# Patient Record
Sex: FEMALE | Race: WHITE
[De-identification: names, ages, dates, MRNs, and addresses within clinical notes are randomized per-mention and may not be internally consistent; named-entity substitution may affect disease eponyms.]

---

## 2019-08-12 ENCOUNTER — HOSPITAL ENCOUNTER (OUTPATIENT)
Dept: HOSPITAL 74 - JASU-SURG | Age: 39
Discharge: HOME | End: 2019-08-12
Attending: UROLOGY
Payer: COMMERCIAL

## 2019-08-12 VITALS — HEART RATE: 70 BPM | SYSTOLIC BLOOD PRESSURE: 126 MMHG | DIASTOLIC BLOOD PRESSURE: 92 MMHG

## 2019-08-12 VITALS — BODY MASS INDEX: 29.9 KG/M2

## 2019-08-12 VITALS — TEMPERATURE: 98 F

## 2019-08-12 DIAGNOSIS — N20.0: Primary | ICD-10-CM

## 2019-08-12 PROCEDURE — 0TF3XZZ FRAGMENTATION IN RIGHT KIDNEY PELVIS, EXTERNAL APPROACH: ICD-10-PCS | Performed by: UROLOGY

## 2019-08-12 NOTE — OP
Operative Note





- Note:


Operative Date: 08/12/19


Pre-Operative Diagnosis: Right renal stone


Operation: Right ESW

## 2019-08-12 NOTE — OP
DATE OF OPERATION:  08/12/2019

 

PREOPERATIVE DIAGNOSIS:  Right renal stone.  

 

POSTOPERATIVE DIAGNOSIS:  Right renal stone.  

 

PROCEDURE:  Right extracorporeal shockwave lithotripsy.  

 

ATTENDING:  Jessi Jacinto M.D. 

 

ANESTHESIA:  Fractional.  

 

DESCRIPTION OF PROCEDURE:  Patient was brought in the operating room, placed in a

supine position on the operating room table.  Ultrasonography and fluoroscopy were

performed.  A 5-mm right mid pole stone was identified.  Once the stone was

identified, anesthesia and preoperative antibiotics were administered. 

Extracorporeal shockwave lithotripsy was then performed.  Excellent fragmentation of

the stone was noted under real time ultrasonography and fluoroscopy.  No

complications were noted.

DISPOSITION:  To recovery room.   

 

 

JESSI CLEMENTE M.D.

 

[id number]

DD: 08/12/2019 16:51

DT: 08/12/2019 19:42

Job #:  [id number]

## 2019-08-12 NOTE — OP
Operative Note





- Note:


Operative Date: 08/12/19


Pre-Operative Diagnosis: Right renal stone


Operation: Right ESWL


Findings: 





5 mm mid pole Right renal stone3


Post-Operative Diagnosis: Same as Pre-op


Surgeon: Yariel Jacinto


Anesthesia: Fractional


Estimated Blood Loss (mls): 0


Operative Report Dictated: Yes

## 2020-02-03 ENCOUNTER — HOSPITAL ENCOUNTER (EMERGENCY)
Dept: HOSPITAL 74 - JER | Age: 40
LOS: 1 days | Discharge: LEFT BEFORE DISCHARGE | End: 2020-02-04
Payer: COMMERCIAL

## 2020-02-03 VITALS — DIASTOLIC BLOOD PRESSURE: 76 MMHG | SYSTOLIC BLOOD PRESSURE: 116 MMHG | TEMPERATURE: 97.4 F | HEART RATE: 65 BPM

## 2020-02-03 VITALS — BODY MASS INDEX: 28.1 KG/M2

## 2020-02-03 DIAGNOSIS — R42: ICD-10-CM

## 2020-02-03 DIAGNOSIS — R51: Primary | ICD-10-CM

## 2020-02-03 DIAGNOSIS — R10.31: ICD-10-CM

## 2020-02-03 DIAGNOSIS — R10.11: ICD-10-CM

## 2020-02-03 LAB
APPEARANCE UR: CLEAR
BASOPHILS # BLD: 0.7 % (ref 0–2)
BILIRUB UR STRIP.AUTO-MCNC: NEGATIVE MG/DL
COLOR UR: YELLOW
DEPRECATED RDW RBC AUTO: 13.1 % (ref 11.6–15.6)
EOSINOPHIL # BLD: 3.8 % (ref 0–4.5)
HCT VFR BLD CALC: 36.7 % (ref 32.4–45.2)
HGB BLD-MCNC: 12.3 GM/DL (ref 10.7–15.3)
KETONES UR QL STRIP: NEGATIVE
LEUKOCYTE ESTERASE UR QL STRIP.AUTO: NEGATIVE
LYMPHOCYTES # BLD: 34.6 % (ref 8–40)
MCH RBC QN AUTO: 29.6 PG (ref 25.7–33.7)
MCHC RBC AUTO-ENTMCNC: 33.4 G/DL (ref 32–36)
MCV RBC: 88.7 FL (ref 80–96)
MONOCYTES # BLD AUTO: 4.5 % (ref 3.8–10.2)
NEUTROPHILS # BLD: 56.4 % (ref 42.8–82.8)
NITRITE UR QL STRIP: NEGATIVE
PH UR: 6.5 [PH] (ref 5–8)
PLATELET # BLD AUTO: 315 K/MM3 (ref 134–434)
PMV BLD: 8.3 FL (ref 7.5–11.1)
PROT UR QL STRIP: NEGATIVE
PROT UR QL STRIP: NEGATIVE
RBC # BLD AUTO: 4.14 M/MM3 (ref 3.6–5.2)
SP GR UR: 1.02 (ref 1.01–1.03)
UROBILINOGEN UR STRIP-MCNC: 1 MG/DL (ref 0.2–1)
WBC # BLD AUTO: 8.5 K/MM3 (ref 4–10)

## 2020-02-03 PROCEDURE — 3E033GC INTRODUCTION OF OTHER THERAPEUTIC SUBSTANCE INTO PERIPHERAL VEIN, PERCUTANEOUS APPROACH: ICD-10-PCS

## 2020-02-03 NOTE — PDOC
History of Present Illness





- General


Chief Complaint: Lightheaded


Stated Complaint: HEADACHE/RT. SHOULDER PAIN


Time Seen by Provider: 02/03/20 19:16


History Source: Patient





- History of Present Illness


Initial Comments: 





02/03/20 21:56


40 year old reports that last night she woke up pain to right flank pain 

radiating to right groin. since last night patient reports having a headache 

and dizziness. [id number] this morning








DEnies abdominal pain, 





Past History





- Past Medical History


Allergies/Adverse Reactions: 


 Allergies











Allergy/AdvReac Type Severity Reaction Status Date / Time


 


Sulfa (Sulfonamide Allergy Mild Itching Verified 02/03/20 19:16





Antibiotics)     





[Sulfa(Sulfonamide     





Antibiotics)]     











Home Medications: 


Ambulatory Orders





Mirabegron [Myrbetriq] 50 mg PO DAILY 08/12/19 








Anemia: No


Asthma: No


Cancer: No


Cardiac Disorders: No


CVA: No


COPD: No


CHF: No


Dementia: No


Diabetes: No


GI Disorders: No


 Disorders: No


HTN: No


Hypercholesterolemia: No


Liver Disease: No


Seizures: No


Thyroid Disease: No





- Surgical History


Abdominal Surgery: No


Appendectomy: No


Cardiac Surgery: No


Cholecystectomy: No


Lung Surgery: No


Neurologic Surgery: No


Orthopedic Surgery: No





- Psycho Social/Smoking Cessation Hx


Smoking Status: No


Smoking History: Never smoked


Have you smoked in the past 12 months: No


Number of Cigarettes Smoked Daily: 0


Hx Alcohol Use: Yes


Drug/Substance Use Hx: No


Substance Use Type: Alcohol


Hx Substance Use Treatment: No





*Physical Exam





- Vital Signs


 Last Vital Signs











Temp Pulse Resp BP Pulse Ox


 


 97.8 F   74   16   134/80   100 


 


 02/03/20 19:17  02/03/20 19:17  02/03/20 19:17  02/03/20 19:17  02/03/20 19:17














- Physical Exam


General Appearance: Yes: Appropriately Dressed


HEENT: positive: Other (+ it[place] marie pike)


Respiratory/Chest: positive: Lungs Clear, Normal Breath Sounds


Cardiovascular: positive: Regular Rhythm, Regular Rate


Gastrointestinal/Abdominal: positive: Normal Bowel Sounds, Tender (RUQ), Soft


Musculoskeletal: positive: Normal Inspection.  negative: CVA Tenderness (R)


Integumentary: positive: Normal Color, Dry, Warm


Neurologic: positive: Fully Oriented, Alert, Normal Mood/Affect





ED Treatment Course





- LABORATORY


CBC & Chemistry Diagram: 


 02/03/20 22:10





 02/03/20 22:10





Medical Decision Making





- Medical Decision Making


A: Abdominal pain; headache








P: US








CT head





LAbs





UA





IVF








zofran


02/04/20 01:52


patient eloped with an IV in place. CHarge GE Jasmine and GE Jaime is aware. 

[facility] contacted by RN. 


0





Discharge





- Discharge Information


Problems reviewed: Yes


Clinical Impression/Diagnosis: 


 Dizziness





Abdominal pain


Qualifiers:


 Abdominal location: right upper quadrant Qualified Code(s): R10.11 - Right 

upper quadrant pain





Headache


Qualifiers:


 Headache type: unspecified Headache chronicity pattern: unspecified pattern 

Intractability: not intractable Qualified Code(s): R51 - Headache





Disposition: ELOPED





- Follow up/Referral


Referrals: 


Sarabjit Schofield MD [Primary Care Provider] - 





- Patient Discharge Instructions





- Post Discharge Activity

## 2020-02-03 NOTE — PDOC
Rapid Medical Evaluation


Chief Complaint: Headache


Time Seen by Provider: 02/03/20 19:16


Medical Evaluation: 


 Allergies











Allergy/AdvReac Type Severity Reaction Status Date / Time


 


Sulfa (Sulfonamide Allergy Mild Itching Verified 02/03/20 19:16





Antibiotics)     





[Sulfa(Sulfonamide     





Antibiotics)]     











02/03/20 19:17


This patient had brief in-person evaluation in triage





cc:dizziness





HPI: Patient reports dizziness today


      states dizziness worse when changed from one position to the next.


      States dizziness causes her to vomit





PE:


NAD


clear lungs bilaterally 


neuro: EOMI, PERRLA,





Oders:


antiemetic


This patient will proceed to emergency department for further evaluation.





**Discharge Disposition





- Diagnosis


 Dizziness








- Referrals





- Patient Instructions





- Post Discharge Activity

## 2020-02-04 LAB
ALBUMIN SERPL-MCNC: 3.5 G/DL (ref 3.4–5)
ALP SERPL-CCNC: 84 U/L (ref 45–117)
ALT SERPL-CCNC: 35 U/L (ref 13–61)
ANION GAP SERPL CALC-SCNC: 5 MMOL/L (ref 8–16)
AST SERPL-CCNC: 20 U/L (ref 15–37)
BILIRUB SERPL-MCNC: 0.3 MG/DL (ref 0.2–1)
BUN SERPL-MCNC: 15.3 MG/DL (ref 7–18)
CALCIUM SERPL-MCNC: 8.7 MG/DL (ref 8.5–10.1)
CHLORIDE SERPL-SCNC: 108 MMOL/L (ref 98–107)
CO2 SERPL-SCNC: 26 MMOL/L (ref 21–32)
CREAT SERPL-MCNC: 0.7 MG/DL (ref 0.55–1.3)
GLUCOSE SERPL-MCNC: 100 MG/DL (ref 74–106)
LIPASE SERPL-CCNC: 210 U/L (ref 73–393)
POTASSIUM SERPLBLD-SCNC: 4.8 MMOL/L (ref 3.5–5.1)
PROT SERPL-MCNC: 7.4 G/DL (ref 6.4–8.2)
SODIUM SERPL-SCNC: 139 MMOL/L (ref 136–145)

## 2020-09-11 ENCOUNTER — HOSPITAL ENCOUNTER (EMERGENCY)
Dept: HOSPITAL 74 - JVIRT | Age: 40
Discharge: HOME | End: 2020-09-11
Payer: COMMERCIAL

## 2020-09-11 DIAGNOSIS — Z11.59: Primary | ICD-10-CM

## 2020-09-11 PROCEDURE — U0003 INFECTIOUS AGENT DETECTION BY NUCLEIC ACID (DNA OR RNA); SEVERE ACUTE RESPIRATORY SYNDROME CORONAVIRUS 2 (SARS-COV-2) (CORONAVIRUS DISEASE [COVID-19]), AMPLIFIED PROBE TECHNIQUE, MAKING USE OF HIGH THROUGHPUT TECHNOLOGIES AS DESCRIBED BY CMS-2020-01-R: HCPCS

## 2020-09-11 NOTE — TELE
HPI


Do you have fever,cough or shortness of breath?: No





- General


History Source: Patient


Exam Limitations: No Limitations





- History of Present Illness





09/11/20 16:02


Patient is a 40-year-old female with no past medical history who presents to a 

virtual urgent care visit for a COVID test.  The patient states she was around 

her daughter's father who was positive for COVID 2 weeks ago.  She is concerned 

that she has COVID.  She states that today she woke up and began having throat 

pain.  She denies any fevers or chills.  She denies any body aches.  She states 

she traveled to [place], to the [place], last week but otherwise has not 

traveled within the [place] or outside the [place].  She denies any loss of 

taste.





Please note: This virtual visit was done via audio only as the patient was 

having technical difficulties accessing the video portion of the visit.





Past History





- Medical History


Allergies/Adverse Reactions: 


                                    Allergies











Allergy/AdvReac Type Severity Reaction Status Date / Time


 


Sulfa (Sulfonamide Allergy Mild Itching Verified 06/05/20 21:47





Antibiotics)     





[Sulfa(Sulfonamide     





Antibiotics)]     











Home Medications: 


Ambulatory Orders





Mirabegron [Myrbetriq] 50 mg PO DAILY 08/12/19 


predniSONE [Deltasone -] 60 mg PO DAILY 21 Days #42 tablet 06/05/20 








Anemia: No


Asthma: No


Cancer: No


Cardiac Disorders: No


CVA: No


COPD: No


CHF: No


Dementia: No


Diabetes: No


GI Disorders: No


 Disorders: No


HTN: No


Hypercholesterolemia: No


Liver Disease: No


Seizures: No


Thyroid Disease: No





- Surgical History


Abdominal Surgery: No


Appendectomy: No


Cardiac Surgery: No


Cholecystectomy: No


Lung Surgery: No


Neurologic Surgery: No


Orthopedic Surgery: No





- Psycho-Social/Smoking History


Smoking Status: No


Smoking History: Never smoked


Have you smoked in the past 12 months: No


Number of Cigarettes Smoked Daily: 0





**Review of Systems





- Review of Systems


Comments:: 





09/11/20 16:03


- Review of Systems


Able to Perform ROS?: Yes


Constitutional: No: Fever, Chills, Loss of Appetite, Night Sweats, Weakness; 

positive: COVID test


HEENTM: No: Eye Pain, Vision changes, Ear Pain, Throat Swelling, Mouth Pain, 

Difficulty Swallowing; positive: Throat pain


Respiratory: No: Cough, Shortness of Breath, Wheezing, Sputum Production


Cardiac (ROS): No: Chest Pain, Chest Tightness, Palpitations, Irregular Heart 

Beat, Edema


ABD/GI: No: Nausea, Vomiting, Abdominal Pain, Diarrhea


: No Dysuria, No Hematuria, No Frequency, No Urgency


Musculoskeletal: No: Muscle Pain, Back Pain, Joint Pain, Muscle Weakness, Neck 

Pain


Integumentary: No: Lesions, Rash


Neurological: No: Headache, Numbness, Tingling, Weakness, Speech Difficulties





*Physical Exam





- Physical Exam





09/11/20 16:04


- Physical Exam


HEENT: Normal Voice, Hearing Grossly Normal


Respiratory/Chest: Speaking in full and complete sentences


Neurologic: Fully Oriented, Alert, Normal Mood/Affect, Normal Response





- Medical Decision Making





09/11/20 16:05


Assessment: Patient is a 40-year-old female requesting COVID testing after 

having a recent COVID positive contact and waking up today with throat pain.





Plan:


-COVID swab ordered


-COVID counseling given, isolation precautions reviewed


-Patient to proceed to the [facility] for her testing


-She understands and agrees with this treatment and plan





Discharge


Diagnosis at time of Disposition: 


 Counseled about COVID-19 virus infection, Throat pain








- Referrals


Follow-up Referral(s): 


Bulmaro Schofield MD [Primary Care Provider] - 





- Patient Instructions


Discharge Instructions:  [facility]-Coronavirus Instructions, [facility] COVID-19 Isolation Protocol


Additional Discharge Instructions: 


You were seen via a telehealth visit and tested for COVID today.  You should 

follow isolation precautions as per [place] guidelines.  Thank you for 

participating in our telehealth medicine program.  If you have any worsening 

symptoms such as high fever, shaking chills, profuse vomiting or any other 

worsening symptoms you should go to your local emergency department immediately 

or follow up with your primary care doctor immediately.





If you become symptomatic:


Take Tylenol 650 mg every 6 hours as needed for fever or pain.  You may take 

Robitussin or other over-the-counter cough syrup.  Follow the dosing 

instructions on the bottle.


Warm tea, honey, and salt water gargles may help your symptoms.  Please take 

precautions and self quarantine for 2 weeks and follow-up with your primary care

doctor and the Department of Health.





Return to the nearest emergency department for shortness of breath, difficulty 

breathing, chest pain, or if you have any changes in your symptoms.





- Discharge


Disposition: HOME


Condition at time of Disposition: Stable

## 2021-12-12 ENCOUNTER — HOSPITAL ENCOUNTER (EMERGENCY)
Dept: HOSPITAL 74 - JER | Age: 41
Discharge: HOME | End: 2021-12-12
Payer: COMMERCIAL

## 2021-12-12 VITALS — SYSTOLIC BLOOD PRESSURE: 129 MMHG | HEART RATE: 77 BPM | TEMPERATURE: 98.7 F | DIASTOLIC BLOOD PRESSURE: 89 MMHG

## 2021-12-12 VITALS — BODY MASS INDEX: 30.9 KG/M2

## 2021-12-12 DIAGNOSIS — M54.2: Primary | ICD-10-CM
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
## 2022-07-01 ENCOUNTER — HOSPITAL ENCOUNTER (EMERGENCY)
Dept: HOSPITAL 74 - JER | Age: 42
Discharge: HOME | End: 2022-07-01
Payer: COMMERCIAL

## 2022-07-01 VITALS — DIASTOLIC BLOOD PRESSURE: 76 MMHG | SYSTOLIC BLOOD PRESSURE: 124 MMHG | HEART RATE: 79 BPM | TEMPERATURE: 97.8 F

## 2022-07-01 VITALS — BODY MASS INDEX: 30.9 KG/M2

## 2022-07-01 DIAGNOSIS — L23.7: Primary | ICD-10-CM

## 2022-12-26 ENCOUNTER — HOSPITAL ENCOUNTER (EMERGENCY)
Dept: HOSPITAL 74 - JER | Age: 42
Discharge: HOME | End: 2022-12-26
Payer: COMMERCIAL

## 2022-12-26 VITALS
HEART RATE: 73 BPM | TEMPERATURE: 98.3 F | DIASTOLIC BLOOD PRESSURE: 89 MMHG | SYSTOLIC BLOOD PRESSURE: 122 MMHG | RESPIRATION RATE: 18 BRPM

## 2022-12-26 VITALS — BODY MASS INDEX: 32.5 KG/M2

## 2022-12-26 DIAGNOSIS — M62.830: ICD-10-CM

## 2022-12-26 DIAGNOSIS — N30.00: Primary | ICD-10-CM

## 2022-12-26 LAB
APPEARANCE UR: CLEAR
BACTERIA # UR AUTO: 326 /UL (ref 0–1359)
BILIRUB UR STRIP.AUTO-MCNC: NEGATIVE MG/DL
CASTS URNS QL MICRO: 0 /UL (ref 0–3.1)
COLOR UR: YELLOW
EPITH CASTS URNS QL MICRO: 34 /UL (ref 0–25.1)
KETONES UR QL STRIP: NEGATIVE
LEUKOCYTE ESTERASE UR QL STRIP.AUTO: (no result)
NITRITE UR QL STRIP: NEGATIVE
PH UR: 7.5 [PH] (ref 5–8)
PROT UR QL STRIP: NEGATIVE
PROT UR QL STRIP: NEGATIVE
RBC # BLD AUTO: 84.6 /UL (ref 0–23.9)
SP GR UR: 1.02 (ref 1.01–1.03)
UROBILINOGEN UR STRIP-MCNC: 1 MG/DL (ref 0.2–1)
WBC # UR AUTO: 53 /UL (ref 0–25.8)

## 2022-12-26 PROCEDURE — 3E0233Z INTRODUCTION OF ANTI-INFLAMMATORY INTO MUSCLE, PERCUTANEOUS APPROACH: ICD-10-PCS | Performed by: NURSE PRACTITIONER
